# Patient Record
Sex: FEMALE | Race: BLACK OR AFRICAN AMERICAN | Employment: UNEMPLOYED | URBAN - METROPOLITAN AREA
[De-identification: names, ages, dates, MRNs, and addresses within clinical notes are randomized per-mention and may not be internally consistent; named-entity substitution may affect disease eponyms.]

---
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
## 2021-06-25 ENCOUNTER — HOSPITAL ENCOUNTER (EMERGENCY)
Age: 21
Discharge: HOME | End: 2021-06-25
Attending: EMERGENCY MEDICINE
Payer: MEDICARE

## 2021-06-25 VITALS
HEART RATE: 89 BPM | WEIGHT: 217.59 LBS | OXYGEN SATURATION: 98 % | RESPIRATION RATE: 18 BRPM | DIASTOLIC BLOOD PRESSURE: 95 MMHG | SYSTOLIC BLOOD PRESSURE: 125 MMHG | TEMPERATURE: 99.1 F

## 2021-06-25 DIAGNOSIS — K08.89 TOOTH PAIN: Primary | ICD-10-CM

## 2021-06-25 PROCEDURE — 99282 EMERGENCY DEPT VISIT SF MDM: CPT

## 2021-06-25 RX ORDER — IBUPROFEN 400 MG/1
400 TABLET ORAL EVERY 6 HOURS PRN
Qty: 30 TABLET | Refills: 0 | Status: SHIPPED | OUTPATIENT
Start: 2021-06-25

## 2021-06-25 RX ORDER — AMOXICILLIN AND CLAVULANATE POTASSIUM 875; 125 MG/1; MG/1
1 TABLET, FILM COATED ORAL 2 TIMES DAILY
Qty: 20 TABLET | Refills: 0 | Status: SHIPPED | OUTPATIENT
Start: 2021-06-25 | End: 2021-07-05

## 2021-06-25 NOTE — ED PROVIDER NOTES
CHIEF COMPLAINT  Chief Complaint   Patient presents with    Oral Swelling     lf lower jaw swollen  pt states has decayed teeth  unable to get into dentist       HISTORY OF PRESENT ILLNESS  Ryann Rodriguez is a 22 y.o. female who presents to the ED complaining of a 1 day history of left lower tooth pain and swelling of the left jaw. Patient denies difficulty swallowing. No fevers or chills. No neck pain. No other complaints, modifying factors or associated symptoms. Nursing notes reviewed. Past Medical History:   Diagnosis Date    Foot fracture, right      Past Surgical History:   Procedure Laterality Date    TONSILLECTOMY       History reviewed. No pertinent family history. Social History     Socioeconomic History    Marital status: Single     Spouse name: Not on file    Number of children: Not on file    Years of education: Not on file    Highest education level: Not on file   Occupational History    Not on file   Tobacco Use    Smoking status: Never Smoker    Smokeless tobacco: Never Used   Substance and Sexual Activity    Alcohol use: No    Drug use: No    Sexual activity: Never   Other Topics Concern    Not on file   Social History Narrative    Not on file     Social Determinants of Health     Financial Resource Strain:     Difficulty of Paying Living Expenses:    Food Insecurity:     Worried About Running Out of Food in the Last Year:     [address] in the Last Year:    Transportation Needs:     Lack of Transportation (Medical):      Lack of Transportation (Non-Medical):    Physical Activity:     Days of Exercise per Week:     Minutes of Exercise per Session:    Stress:     Feeling of Stress :    Social Connections:     Frequency of Communication with Friends and Family:     Frequency of Social Gatherings with Friends and Family:     Attends Congregation Services:     Active Member of Clubs or Organizations:     Attends Club or Organization Meetings:     Marital Status: Intimate Partner Violence:     Fear of Current or Ex-Partner:     Emotionally Abused:     Physically Abused:     Sexually Abused:      No current facility-administered medications for this encounter. Current Outpatient Medications   Medication Sig Dispense Refill    amoxicillin-clavulanate (AUGMENTIN) 875-125 MG per tablet Take 1 tablet by mouth 2 times daily for 10 days 20 tablet 0    ibuprofen (IBU) 400 MG tablet Take 1 tablet by mouth every 6 hours as needed for Pain 30 tablet 0    acetaminophen (TYLENOL) 500 MG tablet Take 500 mg by mouth every 6 hours as needed for Pain      ondansetron (ZOFRAN ODT) 4 MG disintegrating tablet Take 1 tablet by mouth every 8 hours as needed for Nausea or Vomiting 20 tablet 0     No Known Allergies    REVIEW OF SYSTEMS  Positives and pertinent negatives as per HPI. Six other systems were reviewed and are negative. Nursing notes pertaining to ROS were reviewed. PHYSICAL EXAM   BP (!) 125/95   Pulse 89   Temp 99.1 °F (37.3 °C) (Oral)   Resp 18   Wt 217 lb 9.5 oz (98.7 kg)   SpO2 98%   GENERAL APPEARANCE: Awake and alert. Cooperative. No acute distress. HEAD: Normocephalic. Atraumatic. EYES: PERRL. EOM's grossly intact. No scleral icterus, injection or exudate  ENT: Mucous membranes are moist.  Tenderness to percussion of the first premolar. Patient has swelling and mild tenderness over the left lower mandible. NECK: Supple. Normal ROM. There is no tenderness, induration or lymphadenopathy of the deep parotid, submandibular, submental or subglottic space. Full protrusion of the tongue. No trismus. CHEST:  Regular rate and rhythm, no murmurs, rubs or gallops  LUNGS: Breathing is unlabored. Speaking comfortably in full sentences. Clear through auscultation bilaterally  ABDOMEN: Nondistended, nontender  EXTREMITIES: MAEE. No acute deformities. SKIN: Warm and dry. NEUROLOGICAL: Alert and oriented.        ED COURSE/MDM  Tooth ache and probable

## 2022-03-29 ENCOUNTER — HOSPITAL ENCOUNTER (EMERGENCY)
Age: 22
Discharge: HOME | End: 2022-03-29
Attending: EMERGENCY MEDICINE
Payer: MEDICARE

## 2022-03-29 VITALS
HEIGHT: 62 IN | DIASTOLIC BLOOD PRESSURE: 78 MMHG | SYSTOLIC BLOOD PRESSURE: 141 MMHG | OXYGEN SATURATION: 100 % | TEMPERATURE: 98.3 F | HEART RATE: 97 BPM | WEIGHT: 215.61 LBS | RESPIRATION RATE: 16 BRPM | BODY MASS INDEX: 39.68 KG/M2

## 2022-03-29 DIAGNOSIS — N94.9 ROUND LIGAMENT PAIN: Primary | ICD-10-CM

## 2022-03-29 LAB
BACTERIA: ABNORMAL /HPF
BILIRUBIN URINE: NEGATIVE
BLOOD, URINE: ABNORMAL
CLARITY: CLEAR
COLOR: YELLOW
EPITHELIAL CELLS, UA: ABNORMAL /HPF (ref 0–5)
GLUCOSE URINE: NEGATIVE MG/DL
KETONES, URINE: NEGATIVE MG/DL
LEUKOCYTE ESTERASE, URINE: NEGATIVE
MICROSCOPIC EXAMINATION: YES
NITRITE, URINE: NEGATIVE
PH UA: 6.5 (ref 5–8)
PROTEIN UA: NEGATIVE MG/DL
RBC UA: ABNORMAL /HPF (ref 0–4)
SPECIFIC GRAVITY UA: 1.01 (ref 1–1.03)
TRICHOMONAS: ABNORMAL /HPF
URINE REFLEX TO CULTURE: ABNORMAL
URINE TYPE: ABNORMAL
UROBILINOGEN, URINE: 0.2 E.U./DL
WBC UA: ABNORMAL /HPF (ref 0–5)

## 2022-03-29 PROCEDURE — 87491 CHLMYD TRACH DNA AMP PROBE: CPT

## 2022-03-29 PROCEDURE — 87591 N.GONORRHOEAE DNA AMP PROB: CPT

## 2022-03-29 PROCEDURE — 99283 EMERGENCY DEPT VISIT LOW MDM: CPT

## 2022-03-29 PROCEDURE — 81001 URINALYSIS AUTO W/SCOPE: CPT

## 2022-03-29 ASSESSMENT — PAIN DESCRIPTION - LOCATION: LOCATION: PELVIS

## 2022-03-29 ASSESSMENT — PAIN SCALES - GENERAL: PAINLEVEL_OUTOF10: 6

## 2022-03-29 ASSESSMENT — PAIN DESCRIPTION - ORIENTATION: ORIENTATION: LEFT;RIGHT

## 2022-03-29 ASSESSMENT — PAIN DESCRIPTION - DESCRIPTORS: DESCRIPTORS: ACHING

## 2022-03-29 NOTE — ED PROVIDER NOTES
eMERGENCY dEPARTMENT eNCOUnter      Pt Name: Mee Og  MRN: [id number]  [place] 2000  Date of evaluation: 3/29/2022  Provider: Ashley Broderick MD     [address] [place] [address]       Chief Complaint   Patient presents with    Pelvic Pain     x1wk, pt denies bleeding. Pt states she is 6.5mo pregnant, LMP 8/24/21 or 9/24/21, HENRY 6/9/22. Pt sees Dr. Carissa Mayer at [facility] [address] for obgyn         HISTORY OF PRESENT ILLNESS   (Location/Symptom, Timing/Onset,Context/Setting, Quality, Duration, Modifying Factors, Severity) Note limiting factors. HPI    Mee Og is a 25 y.o. female who presents to the emergency department with some lower abdominal pain. Patient is a G2 para 1 about 6.5 months pregnant presents with 1 week history of lower abdominal pain. It radiates both sides going to the back. She denies any dysuria no bleeding. No vaginal discharge. Pain gets worse when she walks. No evidence for contractions. Had a normal OB/GYN exam last time. Scheduled for another one next month. No nausea or vomiting. Nursing Notes were reviewed. REVIEW OFSYSTEMS    (2+ for level 4; 10+ for level 5)   Review of Systems    General: No fevers, chills or night sweats, No weight loss    Head:  No Sore throat,  No Ear Pain    Chest:  Nontender. No Cough, No SOB,  Chest Pain    GI: Positive abdominal pain without vomiting    : No dysuria or hematuria    Musculoskeletal: No unrelenting pain or night pain    Neurologic: No bowel or bladder incontinence, No saddle anesthesia, No leg weakness    All other systems reviewed and are negative.         PAST MEDICAL HISTORY     Past Medical History:   Diagnosis Date    Foot fracture, right        SURGICAL HISTORY       Past Surgical History:   Procedure Laterality Date    TONSILLECTOMY         CURRENT MEDICATIONS       Discharge Medication List as of 3/29/2022 12:51 PM      CONTINUE these medications which have NOT CHANGED    Details   ibuprofen (IBU) 400 MG tablet Take 1 tablet by mouth every 6 hours as needed for Pain, Disp-30 tablet, R-0Normal      acetaminophen (TYLENOL) 500 MG tablet Take 500 mg by mouth every 6 hours as needed for Pain      ondansetron (ZOFRAN ODT) 4 MG disintegrating tablet Take 1 tablet by mouth every 8 hours as needed for Nausea or Vomiting, Disp-20 tablet, R-0             ALLERGIES     Patient has no known allergies. FAMILY HISTORY     History reviewed. No pertinent family history. SOCIAL HISTORY       Social History     Socioeconomic History    Marital status: Single     Spouse name: None    Number of children: None    Years of education: None    Highest education level: None   Occupational History    None   Tobacco Use    Smoking status: Never Smoker    Smokeless tobacco: Never Used   Substance and Sexual Activity    Alcohol use: No    Drug use: No    Sexual activity: Never   Other Topics Concern    None   Social History Narrative    None     Social Determinants of Health     Financial Resource Strain:     Difficulty of Paying Living Expenses: Not on file   Food Insecurity:     Worried About Running Out of Food in the Last Year: Not on file    [place] of Food in the Last Year: Not on file   Transportation Needs:     Lack of Transportation (Medical): Not on file    Lack of Transportation (Non-Medical):  Not on file   Physical Activity:     Days of Exercise per Week: Not on file    Minutes of Exercise per Session: Not on file   Stress:     Feeling of Stress : Not on file   Social Connections:     Frequency of Communication with Friends and Family: Not on file    Frequency of Social Gatherings with Friends and Family: Not on file    Attends Voodoo Services: Not on file    Active Member of Clubs or Organizations: Not on file    Attends Club or Organization Meetings: Not on file    Marital Status: Not on file   Intimate Partner Violence:     Fear of Current or Ex-Partner: Not on file    Emotionally Abused: Not on file   Alfredo Calvert Physically Abused: Not on file    Sexually Abused: Not on file   Housing Stability:     Unable to Pay for Housing in the Last Year: Not on file    Number of Places Lived in the Last Year: Not on file    Unstable Housing in the Last Year: Not on file       SCREENINGS           PHYSICAL EXAM    (up to 7 for level 4, 8 or more for level 5)     ED Triage Vitals [03/29/22 1153]   BP Temp Temp Source Pulse Resp SpO2 Height Weight   (!) 141/78 98.3 °F (36.8 °C) Oral 97 16 100 % 5' 2\" (1.575 m) 215 lb 9.8 oz (97.8 kg)       Physical Exam    General: Alert and awake ×3. Nontoxic appearance. Well-developed well-nourished 73-year-old black female no distress  HEENT: Normocephalic atraumatic. Neck is supple. Airway intact. No adenopathy  Cardiac: Regular rate and rhythm with no murmurs rubs or gallops  Pulmonary: Lungs are clear in all lung fields. No wheezing. No Rales. Abdomen: Soft and nontender. Negative hepatosplenomegaly. Bowel sounds are active. Fundal height is above the umbilicus. There is no tenderness no signs of guarding or contractions. Finger extremities well extremities: Moving all extremities. No calf tenderness. Peripheral pulses all intact  Skin: No skin lesions. No rashes  Neurologic: Cranial nerves II through XII was grossly intact. Nonfocal neurological exam  Psychiatric: Patient is pleasant. Mood is appropriate. DIAGNOSTIC RESULTS     EKG (Per Emergency Physician):       RADIOLOGY (Per Emergency Physician): Interpretation per the Radiologist below, if available at the time of this note:  No results found.     ED BEDSIDE ULTRASOUND:   Performed by ED Physician - none    LABS:  Labs Reviewed   URINALYSIS WITH REFLEX TO CULTURE - Abnormal; Notable for the following components:       Result Value    Blood, Urine TRACE-LYSED (*)     All other components within normal limits   MICROSCOPIC URINALYSIS - Abnormal; Notable for the following components:    Epithelial Cells, UA 6-10 (*)     Bacteria, UA Rare (*)     All other components within normal limits   WET PREP, GENITAL   C.TRACHOMATIS N.GONORRHOEAE DNA        All other labs were within normal range or not returned as of this dictation. Procedures      EMERGENCY DEPARTMENT COURSE and DIFFERENTIAL DIAGNOSIS/MDM:   Vitals:    Vitals:    03/29/22 1153   BP: (!) 141/78   Pulse: 97   Resp: 16   Temp: 98.3 °F (36.8 °C)   TempSrc: Oral   SpO2: 100%   Weight: 215 lb 9.8 oz (97.8 kg)   Height: 5' 2\" (1.575 m)       Medications - No data to display    MDM. Patient is 81-year-old 6-1/2 months pregnant presents with round ligament pain. It is described bilaterally. Goes to the back. Movement makes it worse. Fetal heart tone is appropriate for age. About 145. No bleeding. Urinalysis is negative patient will follow up. Reassurance instructions for round ligament pain given. Discharged in good condition  REVAL:         CRITICAL CARE TIME   Total CriticalCare time was 0 minutes, excluding separately reportable procedures. There was a high probability of clinically significant/life threatening deterioration in the patient's condition which required my urgent intervention. CONSULTS:  None    PROCEDURES:  Unless otherwise noted below, none     [unfilled]    FINAL IMPRESSION      1. Round ligament pain          DISPOSITION/PLAN   DISPOSITION        PATIENT REFERRED TO:  OB/GYN    Schedule an appointment as soon as possible for a visit in 1 week  If symptoms worsen, As needed      DISCHARGE MEDICATIONS:  Discharge Medication List as of 3/29/2022 12:51 PM             (Please note:  Portions of this note were completed with a voice recognition program.Efforts were made to edit the dictations but occasionally words and phrases are mis-transcribed.)  Form v2016. J.5-cn    Aniceto ECHAVARRIA MD (electronically signed)  Emergency Medicine Provider        Farrukh Polanco MD  03/29/22 1257

## 2022-03-31 LAB
C. TRACHOMATIS DNA ,URINE: NEGATIVE
N. GONORRHOEAE DNA, URINE: NEGATIVE

## 2022-11-10 ENCOUNTER — APPOINTMENT (OUTPATIENT)
Dept: GENERAL RADIOLOGY | Age: 22
End: 2022-11-10
Payer: MEDICARE

## 2022-11-10 ENCOUNTER — HOSPITAL ENCOUNTER (EMERGENCY)
Age: 22
Discharge: HOME | End: 2022-11-10
Attending: STUDENT IN AN ORGANIZED HEALTH CARE EDUCATION/TRAINING PROGRAM
Payer: MEDICARE

## 2022-11-10 VITALS
OXYGEN SATURATION: 99 % | RESPIRATION RATE: 21 BRPM | HEIGHT: 62 IN | HEART RATE: 82 BPM | SYSTOLIC BLOOD PRESSURE: 148 MMHG | WEIGHT: 218.7 LBS | DIASTOLIC BLOOD PRESSURE: 89 MMHG | TEMPERATURE: 99.1 F | BODY MASS INDEX: 40.25 KG/M2

## 2022-11-10 DIAGNOSIS — R55 NEAR SYNCOPE: Primary | ICD-10-CM

## 2022-11-10 PROCEDURE — 93005 ELECTROCARDIOGRAM TRACING: CPT | Performed by: STUDENT IN AN ORGANIZED HEALTH CARE EDUCATION/TRAINING PROGRAM

## 2022-11-10 PROCEDURE — 71045 X-RAY EXAM CHEST 1 VIEW: CPT

## 2022-11-10 PROCEDURE — 99284 EMERGENCY DEPT VISIT MOD MDM: CPT

## 2022-11-11 LAB
EKG ATRIAL RATE: 81 BPM
EKG DIAGNOSIS: NORMAL
EKG P AXIS: 37 DEGREES
EKG P-R INTERVAL: 148 MS
EKG Q-T INTERVAL: 378 MS
EKG QRS DURATION: 72 MS
EKG QTC CALCULATION (BAZETT): 439 MS
EKG R AXIS: 30 DEGREES
EKG T AXIS: 25 DEGREES
EKG VENTRICULAR RATE: 81 BPM

## 2022-11-11 ASSESSMENT — ENCOUNTER SYMPTOMS
SHORTNESS OF BREATH: 0
EYE PAIN: 0
DIARRHEA: 0
ABDOMINAL PAIN: 0
COUGH: 0
SORE THROAT: 0
VOMITING: 0
NAUSEA: 0
BACK PAIN: 0

## 2022-11-11 NOTE — ED PROVIDER NOTES
[address] ENCOUNTER      Pt Name: Lorie Pritchard  MRN: [id number]  [place] 2000  Date of evaluation: 11/10/2022  Provider: Ke Das MD    CHIEF COMPLAINT       Near syncope    HISTORY OF PRESENT ILLNESS   (Location/Symptom, Timing/Onset,Context/Setting, Quality, Duration, Modifying Factors, Severity)  Note limiting factors. Lorie Pritchard is a 25 y.o. female who presents to the ED with a chief complaint of near syncope that occurred just prior to arrival.  [place] like she might pass out earlier, had sensation of potential palpitations, last on the order of seconds, now resolved. Associated with feeling anxious. Patient feeling much better now. Had mild chest pain described as tightness while this was happening. Denies current chest pain, leg swelling, focal weakness, shortness of breath, nausea, vomiting. Has history of anemia, takes iron supplementation but states that periods not heavier than typical.  Not feeling lightheaded now even when she stands. Symptoms not otherwise alleviated or exacerbated by other factors. NursingNotes were reviewed. REVIEW OF SYSTEMS    (2-9 systems for level 4, 10 or more for level 5)     Review of Systems   Constitutional:  Negative for chills and fever. HENT:  Negative for congestion and sore throat. Eyes:  Negative for pain and visual disturbance. Respiratory:  Negative for cough and shortness of breath. Cardiovascular:  Positive for palpitations. Negative for chest pain. Gastrointestinal:  Negative for abdominal pain, diarrhea, nausea and vomiting. Genitourinary:  Negative for dysuria and frequency. Musculoskeletal:  Negative for back pain and neck pain. Skin:  Negative for rash and wound. Neurological:  Positive for light-headedness. Negative for dizziness and weakness.       PAST MEDICAL HISTORY     Past Medical History:   Diagnosis Date    Foot fracture, right SURGICALHISTORY       Past Surgical History:   Procedure Laterality Date    TONSILLECTOMY           CURRENT MEDICATIONS       Discharge Medication List as of 11/10/2022 10:51 PM        CONTINUE these medications which have NOT CHANGED    Details   ibuprofen (IBU) 400 MG tablet Take 1 tablet by mouth every 6 hours as needed for Pain, Disp-30 tablet, R-0Normal      acetaminophen (TYLENOL) 500 MG tablet Take 500 mg by mouth every 6 hours as needed for Pain      ondansetron (ZOFRAN ODT) 4 MG disintegrating tablet Take 1 tablet by mouth every 8 hours as needed for Nausea or Vomiting, Disp-20 tablet, R-0             ALLERGIES     Patient has no known allergies. FAMILY HISTORY     History reviewed. No pertinent family history. SOCIAL HISTORY       Social History     Socioeconomic History    Marital status: Single     Spouse name: None    Number of children: None    Years of education: None    Highest education level: None   Tobacco Use    Smoking status: Never    Smokeless tobacco: Never   Substance and Sexual Activity    Alcohol use: No    Drug use: No    Sexual activity: Never       SCREENINGS    [place] Coma Scale  Eye Opening: Spontaneous  Best Verbal Response: Oriented  Best Motor Response: Obeys commands  [place] Coma Scale Score: 15        PHYSICAL EXAM    (up to 7 for level 4, 8 or more for level 5)     ED Triage Vitals [11/10/22 2150]   BP Temp Temp Source Heart Rate Resp SpO2 Height Weight   (!) 154/96 99.1 °F (37.3 °C) Oral 82 21 100 % 5' 2\" (1.575 m) 218 lb 11.1 oz (99.2 kg)       General: Alert and oriented appropriately for age, No acute distress. Eye: Normal conjunctiva. Sclera anicteric. HENT: Oral mucosa is moist.  Respiratory: Respirations even and non-labored. Clear to auscultation bilaterally. Cardiovascular: Normal rate, Regular rhythm. Intact peripheral pulses. Grade 2/6 systolic murmur best heard over the left upper sternal border.   Gastrointestinal: Soft, Non-tender, Non-distended. : deferred. Musculoskeletal: No swelling. Integumentary: Warm, Dry. Neurologic: Alert and appropriate for age. No focal deficits. Psychiatric: Cooperative. DIAGNOSTIC RESULTS     EKG: All EKG's are interpreted by the Emergency Department Physician who either signs or Co-signsthis chart in the absence of a cardiologist.        The Ekg interpreted by me shows  Rhythm NSR  Rate of 81 bpm.  Axis is  normal  Intervals and durations are unremarkable. ST Segments: normal, no ST elevation. No prior EKG for comparison. RADIOLOGY:   Non-plain filmimages such as CT, Ultrasound and MRI are read by the radiologist. Plain radiographic images are visualized and preliminarily interpreted by the emergency physician with the below findings:      Interpretation per the Radiologist below, if available at the time ofthis note:    XR CHEST PORTABLE   Final Result   No acute process. EMERGENCY DEPARTMENT COURSE and DIFFERENTIAL DIAGNOSIS/MDM:   Vitals:    Vitals:    11/10/22 2150 11/10/22 2200   BP: (!) 154/96 (!) 148/89   Pulse: 82 82   Resp: 21 21   Temp: 99.1 °F (37.3 °C)    TempSrc: Oral    SpO2: 100% 99%   Weight: 218 lb 11.1 oz (99.2 kg)    Height: 5' 2\" (1.575 m)          Medical decision makin-year-old female presents after an episode of near syncope, described as lightheadedness, not vertiginous, NIH stroke scale 0, negative test of skew, steady ambulation in the emergency department. Hemodynamically stable, neurologically intact. Exam reassuring, established systolic murmur is auscultated, likely flow murmur, there may be some degree of anemia but patient is not currently symptomatic which would be expected if her symptoms were secondary to anemia. States that she felt anxious while this was ongoing. EKG is obtained, no evidence of acute anemia or evidence of predisposing factors to malignant dysrhythmia. Chest x-ray obtained is negative as well.   Rx Holter monitor, given outpatient instructions, discharge instructions, return precautions, patient voiced understanding. Discharged home, departed the ED ambulatory in stable condition. I estimate there is LOW risk for (including but not limited to) INTRACRANIAL HEMORRHAGE, ISCHEMIC CVA,  MENINGITIS, ACUTE CORONARY SYNDROME, MALIGNANT DYSRHYTHMIA, PULMONARY EMBOLISM, PNEUMONIA or SEPSIS, thus I consider the discharge disposition reasonable. Discharge is especially supported since the patient has improvement of presenting symptoms, demonstrates steady ambulation at their baseline in the emergency department on my reassessment, persists to have no focal neurologic deficits on my reexamination, and demonstrates robust p.o. tolerance. Crissy Ferguson (or their surrogate) and I have discussed the diagnosis and risks, and we agree with discharging home with close follow-up. We also discussed returning to the Emergency Department immediately if new or worsening symptoms occur. We have discussed the symptoms which are most concerning that necessitate immediate return. FINAL IMPRESSION      1.  Near syncope          DISPOSITION/PLAN   DISPOSITION Decision To Discharge 11/10/2022 10:40:29 PM      PATIENT REFERRED TO:  Masood Dunaway MD  [address] [place] [address]  [phone]    On 11/14/2022      [place] [address]  [phone]    If symptoms worsen      DISCHARGE MEDICATIONS:  Discharge Medication List as of 11/10/2022 10:51 PM             (Please note that portions of this note were completed with a voice recognition program.Efforts were made to edit the dictations but occasionally words are mis-transcribed.)    Leona Curtis MD (electronically signed)  Attending Emergency Physician          Leona Curtis MD  11/11/22 4237

## 2022-11-11 NOTE — ED NOTES
Patient presents with near syncope. States she was taking food out of the oven at home and felt like she was going to pass out. Vitals Are WNL. She did have a baby in June. Patient does take BP medications.       Azra Bazan RN  11/10/22 1024

## 2023-11-26 ENCOUNTER — HOSPITAL ENCOUNTER (EMERGENCY)
Age: 23
Discharge: HOME | End: 2023-11-26
Attending: EMERGENCY MEDICINE
Payer: MEDICAID

## 2023-11-26 ENCOUNTER — APPOINTMENT (OUTPATIENT)
Dept: GENERAL RADIOLOGY | Age: 23
End: 2023-11-26
Payer: MEDICAID

## 2023-11-26 VITALS
OXYGEN SATURATION: 100 % | DIASTOLIC BLOOD PRESSURE: 106 MMHG | TEMPERATURE: 98 F | HEART RATE: 80 BPM | SYSTOLIC BLOOD PRESSURE: 169 MMHG | RESPIRATION RATE: 18 BRPM | WEIGHT: 220.24 LBS | BODY MASS INDEX: 40.53 KG/M2 | HEIGHT: 62 IN

## 2023-11-26 DIAGNOSIS — S60.221A CONTUSION OF RIGHT HAND, INITIAL ENCOUNTER: Primary | ICD-10-CM

## 2023-11-26 PROCEDURE — 99283 EMERGENCY DEPT VISIT LOW MDM: CPT

## 2023-11-26 PROCEDURE — 73130 X-RAY EXAM OF HAND: CPT

## 2023-11-26 ASSESSMENT — PAIN DESCRIPTION - LOCATION: LOCATION: HAND

## 2023-11-26 ASSESSMENT — LIFESTYLE VARIABLES
HOW MANY STANDARD DRINKS CONTAINING ALCOHOL DO YOU HAVE ON A TYPICAL DAY: PATIENT DOES NOT DRINK
HOW OFTEN DO YOU HAVE A DRINK CONTAINING ALCOHOL: NEVER

## 2023-11-26 ASSESSMENT — PAIN DESCRIPTION - ORIENTATION: ORIENTATION: RIGHT

## 2023-11-26 ASSESSMENT — PAIN - FUNCTIONAL ASSESSMENT
PAIN_FUNCTIONAL_ASSESSMENT: 0-10
PAIN_FUNCTIONAL_ASSESSMENT: PREVENTS OR INTERFERES SOME ACTIVE ACTIVITIES AND ADLS

## 2023-11-26 ASSESSMENT — PAIN DESCRIPTION - PAIN TYPE: TYPE: ACUTE PAIN

## 2023-11-26 ASSESSMENT — PAIN SCALES - GENERAL: PAINLEVEL_OUTOF10: 6

## 2023-11-26 ASSESSMENT — PAIN DESCRIPTION - DESCRIPTORS: DESCRIPTORS: DISCOMFORT;SHARP;ACHING

## 2023-11-26 NOTE — DISCHARGE INSTRUCTIONS
Use ice to the affected area. Avoid heat or heating pads. Follow-up with your primary care physician in 1 to 2 days for reexamination. If no improvement recommend follow-up with orthopedics next week.     If condition worsens or new symptoms develop, return immediately to the emergency department

## 2023-11-26 NOTE — ED NOTES
Discharge and education instructions reviewed. Patient verbalized understanding, teach-back successful. Patient denied questions at this time. No acute distress noted. Patient instructed to follow-up as noted - return to emergency department if symptoms worsen. Patient verbalized understanding. Discharged per EDMD with discharge instructions.         Kerry Munroe RN  11/26/23 1133

## 2023-11-26 NOTE — ED TRIAGE NOTES
Pt to ER C/o smashing right hand with dresser. Obvious, mild swelling noted to top of right hand, pain 8/10 when moving fingers, no pain when holding still.

## 2023-12-01 ENCOUNTER — OFFICE VISIT (OUTPATIENT)
Dept: ORTHOPEDIC SURGERY | Age: 23
End: 2023-12-01

## 2023-12-01 VITALS — HEIGHT: 62 IN | BODY MASS INDEX: 40.48 KG/M2 | WEIGHT: 220 LBS

## 2023-12-01 DIAGNOSIS — S63.501A SPRAIN OF RIGHT WRIST, INITIAL ENCOUNTER: ICD-10-CM

## 2023-12-01 DIAGNOSIS — S60.221A CONTUSION OF RIGHT HAND, INITIAL ENCOUNTER: Primary | ICD-10-CM

## 2023-12-01 DIAGNOSIS — M25.531 RIGHT WRIST PAIN: ICD-10-CM

## 2023-12-01 RX ORDER — IBUPROFEN 600 MG/1
600 TABLET ORAL 2 TIMES DAILY WITH MEALS
Qty: 60 TABLET | Refills: 0 | Status: SHIPPED | OUTPATIENT
Start: 2023-12-01

## 2024-04-24 ENCOUNTER — OFFICE VISIT (OUTPATIENT)
Age: 24
End: 2024-04-24

## 2024-04-24 VITALS
HEART RATE: 88 BPM | SYSTOLIC BLOOD PRESSURE: 162 MMHG | BODY MASS INDEX: 37.78 KG/M2 | OXYGEN SATURATION: 98 % | TEMPERATURE: 98.2 F | HEIGHT: 64 IN | DIASTOLIC BLOOD PRESSURE: 120 MMHG | WEIGHT: 221.3 LBS

## 2024-04-24 DIAGNOSIS — Z02.1 PHYSICAL EXAM, PRE-EMPLOYMENT: Primary | ICD-10-CM

## 2024-04-24 NOTE — PROGRESS NOTES
Saurav James (:  2000) is a 24 y.o. female,New patient, here for evaluation of the following chief complaint(s):  physical for work      ASSESSMENT/PLAN:    ICD-10-CM    1. Physical exam, pre-employment  Z02.1           Dx Disposition: work physical  Education and handout provided on diagnosis and management of symptoms.   AVS reviewed with patient. Follow up as needed in UC or with PCP for new or worsening symptoms.   Return if symptoms worsen or fail to improve.  Pt has not been taking her blood pressure medication even though she has been diagnosed as hypertensive had long discussion about the ramifications of not being treated and long term effects it has on the body pt agrees to start her medication and follow with PMD in one week for possible medication adjustment    SUBJECTIVE/OBJECTIVE:  Patient presents today with complaints of needing work physical       History provided by:  Patient   used: No        Vitals:    24 0825 24 0831   BP: (!) 166/130 (!) 162/120   Site: Left Upper Arm Left Upper Arm   Position: Sitting Sitting   Cuff Size: Large Adult Large Adult   Pulse: 88    Temp: 98.2 °F (36.8 °C)    TempSrc: Oral    SpO2: 98%    Weight: 100.4 kg (221 lb 4.8 oz)    Height: 1.63 m (5' 4.17\")        Review of Systems    Physical Exam  Constitutional:       Appearance: Normal appearance.   HENT:      Head: Normocephalic.      Nose: Nose normal.      Mouth/Throat:      Mouth: Mucous membranes are moist.      Pharynx: Oropharynx is clear.   Cardiovascular:      Rate and Rhythm: Normal rate and regular rhythm.      Heart sounds: Normal heart sounds.   Pulmonary:      Effort: Pulmonary effort is normal.      Breath sounds: Normal breath sounds.   Abdominal:      General: Bowel sounds are normal.      Palpations: Abdomen is soft.   Musculoskeletal:         General: Normal range of motion.      Cervical back: Normal range of motion and neck supple.   Skin:     General: Skin

## 2024-04-24 NOTE — PATIENT INSTRUCTIONS
medicines you take.  How can you care for yourself at home?  Set realistic goals. Many people expect to lose much more weight than is likely. A weight loss of 5% to 10% of your body weight may be enough to improve your health.  Get family and friends involved to provide support. Talk to them about why you are trying to lose weight, and ask them to help. They can help by participating in exercise and having meals with you, even if they may be eating something different.  Find what works best for you. If you do not have time or do not like to cook, a program that offers meal replacement bars or shakes may be better for you. Or if you like to prepare meals, finding a plan that includes daily menus and recipes may be best.  Ask your doctor about other health professionals who can help you achieve your weight loss goals.  A dietitian can help you make healthy changes in your diet.  An exercise specialist or  can help you develop a safe and effective exercise program.  A counselor or psychiatrist can help you cope with issues such as depression, anxiety, or family problems that can make it hard to focus on weight loss.  Consider joining a support group for people who are trying to lose weight. Your doctor can suggest groups in your area.  Where can you learn more?  Go to https://www.[employer].net/patientEd and enter U357 to learn more about \"Starting a Weight Loss Plan: Care Instructions.\"  Current as of: September 20, 2023               Content Version: 14.0  © 4545-8474 [employer].   Care instructions adapted under license by [employer]. If you have questions about a medical condition or this instruction, always ask your healthcare professional. [employer] disclaims any warranty or liability for your use of this information.           Learning About Being Physically Active  What is physical activity?     Being physically active means doing any kind of activity that gets

## 2024-09-19 ENCOUNTER — APPOINTMENT (OUTPATIENT)
Dept: CT IMAGING | Age: 24
End: 2024-09-19
Payer: MEDICAID

## 2024-09-19 ENCOUNTER — HOSPITAL ENCOUNTER (EMERGENCY)
Age: 24
Discharge: HOME | End: 2024-09-20
Attending: EMERGENCY MEDICINE
Payer: MEDICAID

## 2024-09-19 DIAGNOSIS — R00.2 PALPITATIONS: Primary | ICD-10-CM

## 2024-09-19 DIAGNOSIS — Z3A.26 26 WEEKS GESTATION OF PREGNANCY: ICD-10-CM

## 2024-09-19 DIAGNOSIS — R79.89 ELEVATED D-DIMER: ICD-10-CM

## 2024-09-19 LAB
ANION GAP SERPL CALCULATED.3IONS-SCNC: 14 MMOL/L (ref 3–16)
BASOPHILS # BLD: 0 K/UL (ref 0–0.2)
BASOPHILS NFR BLD: 0.4 %
BUN SERPL-MCNC: 8 MG/DL (ref 7–20)
CALCIUM SERPL-MCNC: 9.5 MG/DL (ref 8.3–10.6)
CHLORIDE SERPL-SCNC: 101 MMOL/L (ref 99–110)
CO2 SERPL-SCNC: 19 MMOL/L (ref 21–32)
CREAT SERPL-MCNC: 0.7 MG/DL (ref 0.6–1.1)
D-DIMER QUANTITATIVE: 0.62 UG/ML FEU (ref 0–0.6)
DEPRECATED RDW RBC AUTO: 15.4 % (ref 12.4–15.4)
EOSINOPHIL # BLD: 0 K/UL (ref 0–0.6)
EOSINOPHIL NFR BLD: 0.5 %
GFR SERPLBLD CREATININE-BSD FMLA CKD-EPI: >90 ML/MIN/{1.73_M2}
GLUCOSE SERPL-MCNC: 115 MG/DL (ref 70–99)
HCG SERPL QL: POSITIVE
HCT VFR BLD AUTO: 34.1 % (ref 36–48)
HGB BLD-MCNC: 11.7 G/DL (ref 12–16)
LYMPHOCYTES # BLD: 2 K/UL (ref 1–5.1)
LYMPHOCYTES NFR BLD: 19.3 %
MCH RBC QN AUTO: 28.8 PG (ref 26–34)
MCHC RBC AUTO-ENTMCNC: 34.2 G/DL (ref 31–36)
MCV RBC AUTO: 84.2 FL (ref 80–100)
MONOCYTES # BLD: 0.5 K/UL (ref 0–1.3)
MONOCYTES NFR BLD: 5.3 %
NEUTROPHILS # BLD: 7.6 K/UL (ref 1.7–7.7)
NEUTROPHILS NFR BLD: 74.5 %
PLATELET # BLD AUTO: 410 K/UL (ref 135–450)
PMV BLD AUTO: 9.1 FL (ref 5–10.5)
POTASSIUM SERPL-SCNC: 3.6 MMOL/L (ref 3.5–5.1)
RBC # BLD AUTO: 4.05 M/UL (ref 4–5.2)
SODIUM SERPL-SCNC: 134 MMOL/L (ref 136–145)
TROPONIN, HIGH SENSITIVITY: <6 NG/L (ref 0–14)
TROPONIN, HIGH SENSITIVITY: <6 NG/L (ref 0–14)
WBC # BLD AUTO: 10.1 K/UL (ref 4–11)

## 2024-09-19 PROCEDURE — 36415 COLL VENOUS BLD VENIPUNCTURE: CPT

## 2024-09-19 PROCEDURE — 84703 CHORIONIC GONADOTROPIN ASSAY: CPT

## 2024-09-19 PROCEDURE — 80048 BASIC METABOLIC PNL TOTAL CA: CPT

## 2024-09-19 PROCEDURE — 6360000004 HC RX CONTRAST MEDICATION: Performed by: EMERGENCY MEDICINE

## 2024-09-19 PROCEDURE — 85025 COMPLETE CBC W/AUTO DIFF WBC: CPT

## 2024-09-19 PROCEDURE — 99285 EMERGENCY DEPT VISIT HI MDM: CPT

## 2024-09-19 PROCEDURE — 71260 CT THORAX DX C+: CPT

## 2024-09-19 PROCEDURE — 85379 FIBRIN DEGRADATION QUANT: CPT

## 2024-09-19 PROCEDURE — 84484 ASSAY OF TROPONIN QUANT: CPT

## 2024-09-19 PROCEDURE — 93005 ELECTROCARDIOGRAM TRACING: CPT | Performed by: EMERGENCY MEDICINE

## 2024-09-19 RX ORDER — IOPAMIDOL 755 MG/ML
75 INJECTION, SOLUTION INTRAVASCULAR
Status: COMPLETED | OUTPATIENT
Start: 2024-09-19 | End: 2024-09-19

## 2024-09-19 RX ADMIN — IOPAMIDOL 75 ML: 755 INJECTION, SOLUTION INTRAVENOUS at 23:25

## 2024-09-19 ASSESSMENT — LIFESTYLE VARIABLES
HOW OFTEN DO YOU HAVE A DRINK CONTAINING ALCOHOL: NEVER
HOW MANY STANDARD DRINKS CONTAINING ALCOHOL DO YOU HAVE ON A TYPICAL DAY: PATIENT DOES NOT DRINK

## 2024-09-19 ASSESSMENT — PAIN SCALES - GENERAL: PAINLEVEL_OUTOF10: 6

## 2024-09-20 VITALS
TEMPERATURE: 99.3 F | HEART RATE: 87 BPM | HEIGHT: 62 IN | OXYGEN SATURATION: 100 % | BODY MASS INDEX: 39.32 KG/M2 | SYSTOLIC BLOOD PRESSURE: 125 MMHG | WEIGHT: 213.7 LBS | DIASTOLIC BLOOD PRESSURE: 85 MMHG | RESPIRATION RATE: 15 BRPM

## 2024-09-20 LAB
EKG ATRIAL RATE: 92 BPM
EKG DIAGNOSIS: NORMAL
EKG P AXIS: 45 DEGREES
EKG P-R INTERVAL: 142 MS
EKG Q-T INTERVAL: 362 MS
EKG QRS DURATION: 72 MS
EKG QTC CALCULATION (BAZETT): 447 MS
EKG R AXIS: 37 DEGREES
EKG T AXIS: 7 DEGREES
EKG VENTRICULAR RATE: 92 BPM

## 2024-09-20 PROCEDURE — 93010 ELECTROCARDIOGRAM REPORT: CPT | Performed by: INTERNAL MEDICINE

## 2025-04-24 ENCOUNTER — APPOINTMENT (OUTPATIENT)
Dept: CT IMAGING | Age: 25
End: 2025-04-24
Payer: MEDICAID

## 2025-04-24 ENCOUNTER — HOSPITAL ENCOUNTER (EMERGENCY)
Age: 25
Discharge: HOME | End: 2025-04-24
Attending: EMERGENCY MEDICINE
Payer: MEDICAID

## 2025-04-24 VITALS
TEMPERATURE: 98.4 F | OXYGEN SATURATION: 100 % | DIASTOLIC BLOOD PRESSURE: 134 MMHG | RESPIRATION RATE: 20 BRPM | WEIGHT: 212 LBS | SYSTOLIC BLOOD PRESSURE: 199 MMHG | BODY MASS INDEX: 38.78 KG/M2 | HEART RATE: 87 BPM

## 2025-04-24 DIAGNOSIS — N28.9 RENAL LESION: ICD-10-CM

## 2025-04-24 DIAGNOSIS — R31.9 HEMATURIA, UNSPECIFIED TYPE: ICD-10-CM

## 2025-04-24 DIAGNOSIS — I10 ESSENTIAL HYPERTENSION: Primary | ICD-10-CM

## 2025-04-24 LAB
ALBUMIN SERPL-MCNC: 4.6 G/DL (ref 3.4–5)
ALBUMIN/GLOB SERPL: 1.4 {RATIO} (ref 1.1–2.2)
ALP SERPL-CCNC: 66 U/L (ref 40–129)
ALT SERPL-CCNC: 11 U/L (ref 10–40)
ANION GAP SERPL CALCULATED.3IONS-SCNC: 13 MMOL/L (ref 3–16)
AST SERPL-CCNC: 18 U/L (ref 15–37)
BASOPHILS # BLD: 0.1 K/UL (ref 0–0.2)
BASOPHILS NFR BLD: 1.1 %
BILIRUB SERPL-MCNC: 0.4 MG/DL (ref 0–1)
BILIRUB UR QL STRIP.AUTO: NEGATIVE
BUN SERPL-MCNC: 12 MG/DL (ref 7–20)
CALCIUM SERPL-MCNC: 8.8 MG/DL (ref 8.3–10.6)
CHLORIDE SERPL-SCNC: 104 MMOL/L (ref 99–110)
CLARITY UR: CLEAR
CO2 SERPL-SCNC: 21 MMOL/L (ref 21–32)
COLOR UR: YELLOW
CREAT SERPL-MCNC: 1.2 MG/DL (ref 0.6–1.1)
DEPRECATED RDW RBC AUTO: 17.9 % (ref 12.4–15.4)
EOSINOPHIL # BLD: 0.1 K/UL (ref 0–0.6)
EOSINOPHIL NFR BLD: 1 %
EPI CELLS #/AREA URNS HPF: ABNORMAL /HPF (ref 0–5)
GFR SERPLBLD CREATININE-BSD FMLA CKD-EPI: 65 ML/MIN/{1.73_M2}
GLUCOSE SERPL-MCNC: 89 MG/DL (ref 70–99)
GLUCOSE UR STRIP.AUTO-MCNC: NEGATIVE MG/DL
HCG UR QL: NEGATIVE
HCT VFR BLD AUTO: 34.5 % (ref 36–48)
HGB BLD-MCNC: 11.3 G/DL (ref 12–16)
HGB UR QL STRIP.AUTO: ABNORMAL
KETONES UR STRIP.AUTO-MCNC: NEGATIVE MG/DL
LEUKOCYTE ESTERASE UR QL STRIP.AUTO: NEGATIVE
LYMPHOCYTES # BLD: 1.9 K/UL (ref 1–5.1)
LYMPHOCYTES NFR BLD: 35.3 %
MCH RBC QN AUTO: 24.5 PG (ref 26–34)
MCHC RBC AUTO-ENTMCNC: 32.8 G/DL (ref 31–36)
MCV RBC AUTO: 74.6 FL (ref 80–100)
MONOCYTES # BLD: 0.4 K/UL (ref 0–1.3)
MONOCYTES NFR BLD: 8.4 %
NEUTROPHILS # BLD: 2.9 K/UL (ref 1.7–7.7)
NEUTROPHILS NFR BLD: 54.2 %
NITRITE UR QL STRIP.AUTO: NEGATIVE
PH UR STRIP.AUTO: 6.5 [PH] (ref 5–8)
PLATELET # BLD AUTO: 460 K/UL (ref 135–450)
PMV BLD AUTO: 9.4 FL (ref 5–10.5)
POTASSIUM SERPL-SCNC: 3.9 MMOL/L (ref 3.5–5.1)
PROT SERPL-MCNC: 7.9 G/DL (ref 6.4–8.2)
PROT UR STRIP.AUTO-MCNC: ABNORMAL MG/DL
RBC # BLD AUTO: 4.62 M/UL (ref 4–5.2)
RBC #/AREA URNS HPF: ABNORMAL /HPF (ref 0–4)
SODIUM SERPL-SCNC: 138 MMOL/L (ref 136–145)
SP GR UR STRIP.AUTO: 1.01 (ref 1–1.03)
UA COMPLETE W REFLEX CULTURE PNL UR: ABNORMAL
UA DIPSTICK W REFLEX MICRO PNL UR: YES
URN SPEC COLLECT METH UR: ABNORMAL
UROBILINOGEN UR STRIP-ACNC: 0.2 E.U./DL
WBC # BLD AUTO: 5.3 K/UL (ref 4–11)
WBC #/AREA URNS HPF: ABNORMAL /HPF (ref 0–5)

## 2025-04-24 PROCEDURE — 99284 EMERGENCY DEPT VISIT MOD MDM: CPT

## 2025-04-24 PROCEDURE — 80053 COMPREHEN METABOLIC PANEL: CPT

## 2025-04-24 PROCEDURE — 84703 CHORIONIC GONADOTROPIN ASSAY: CPT

## 2025-04-24 PROCEDURE — 85025 COMPLETE CBC W/AUTO DIFF WBC: CPT

## 2025-04-24 PROCEDURE — 36415 COLL VENOUS BLD VENIPUNCTURE: CPT

## 2025-04-24 PROCEDURE — 6370000000 HC RX 637 (ALT 250 FOR IP): Performed by: EMERGENCY MEDICINE

## 2025-04-24 PROCEDURE — 81001 URINALYSIS AUTO W/SCOPE: CPT

## 2025-04-24 PROCEDURE — 74176 CT ABD & PELVIS W/O CONTRAST: CPT

## 2025-04-24 RX ORDER — NIFEDIPINE 30 MG/1
60 TABLET, EXTENDED RELEASE ORAL ONCE
Status: COMPLETED | OUTPATIENT
Start: 2025-04-24 | End: 2025-04-24

## 2025-04-24 RX ADMIN — NIFEDIPINE 60 MG: 30 TABLET, FILM COATED, EXTENDED RELEASE ORAL at 10:16

## 2025-04-24 ASSESSMENT — ENCOUNTER SYMPTOMS
BACK PAIN: 1
COLOR CHANGE: 0
TROUBLE SWALLOWING: 0
VOMITING: 0
NAUSEA: 0
STRIDOR: 0
FACIAL SWELLING: 0
VOICE CHANGE: 0
SHORTNESS OF BREATH: 0
ABDOMINAL PAIN: 0
WHEEZING: 0

## 2025-04-24 ASSESSMENT — LIFESTYLE VARIABLES
HOW OFTEN DO YOU HAVE A DRINK CONTAINING ALCOHOL: MONTHLY OR LESS
HOW MANY STANDARD DRINKS CONTAINING ALCOHOL DO YOU HAVE ON A TYPICAL DAY: 1 OR 2

## 2025-04-24 NOTE — ED NOTES
Patient DCed from ED at this time. Discussed AVS, follow up, and scripts. They verbalized understanding. Reinforced that should symptoms persist or worsen to return to the ED. They verbalized understanding. Patient ambulated out of ED. RN thanked patient for choosing [facility].

## 2025-04-24 NOTE — DISCHARGE INSTRUCTIONS
As we discussed your blood pressure was extremely elevated today.  It did mildly improve after giving you your home blood pressure medicine.  It is very important that you take your blood pressure as prescribed, stay well-hydrated, and follow-up with your primary care doctor for repeat blood pressure check and possible medication adjustments.  If any point in time you develop chest pain, numbness or weakness, facial droop, or trouble speaking or are unable to make urine please come to the ER immediately.  As we also discussed you do have multiple lesions on your kidneys and is important that you follow-up as an outpatient for MRI.  Please discuss this with your primary care doctor.

## 2025-04-24 NOTE — ED NOTES
Patient presents to ED with blood in urine. States she has a hx of kidney infection. Denies fever. BP is elevated, states she has not taken her BP meds today. Denies CP or SOB.

## 2025-04-24 NOTE — ED PROVIDER NOTES
prescribed at home and return to the emergency department if she has any signs of endorgan damage specifically any chest pain, headache, facial droop, numbness or weakness, or decreased urinary output.  Patient will also stay hydrated.  Patient expresses understanding and agreement with this plan and is discharged home.    FINAL IMPRESSION      1. Essential hypertension    2. Hematuria, unspecified type    3. Renal lesion          DISPOSITION/PLAN     DISPOSITION Decision To Discharge 04/24/2025 11:59:08 AM   DISPOSITION CONDITION Stable           PATIENT REFERRED TO:  Marina Morrow MD  [address] [id number] [phone]    In 1 day      [facility] Emergency Department  [address] [facility] [id number] [phone]    If symptoms worsen        (Please note that portions of this note were completed with a voice recognition program.  Efforts were made to edit the dictations but occasionally words are mis-transcribed.)    Sam Burrell MD (electronically signed)  Attending Emergency Physician           Sam Burrell MD  04/24/25 [id number]